# Patient Record
Sex: FEMALE | Race: WHITE
[De-identification: names, ages, dates, MRNs, and addresses within clinical notes are randomized per-mention and may not be internally consistent; named-entity substitution may affect disease eponyms.]

---

## 2019-03-16 ENCOUNTER — HOSPITAL ENCOUNTER (EMERGENCY)
Dept: HOSPITAL 96 - M.ERS | Age: 59
Discharge: HOME | End: 2019-03-16
Payer: COMMERCIAL

## 2019-03-16 VITALS — BODY MASS INDEX: 36.1 KG/M2 | HEIGHT: 67 IN | WEIGHT: 230.01 LBS

## 2019-03-16 VITALS — SYSTOLIC BLOOD PRESSURE: 131 MMHG | DIASTOLIC BLOOD PRESSURE: 71 MMHG
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
## 2019-03-16 DIAGNOSIS — Z91.040: ICD-10-CM

## 2019-03-16 DIAGNOSIS — E04.1: ICD-10-CM

## 2019-03-16 DIAGNOSIS — F31.9: ICD-10-CM

## 2019-03-16 DIAGNOSIS — S93.491A: ICD-10-CM

## 2019-03-16 DIAGNOSIS — I10: ICD-10-CM

## 2019-03-16 DIAGNOSIS — M25.561: ICD-10-CM

## 2019-03-16 DIAGNOSIS — Z88.5: ICD-10-CM

## 2019-03-16 DIAGNOSIS — Z98.890: ICD-10-CM
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
## 2019-03-16 DIAGNOSIS — Z90.711: ICD-10-CM

## 2019-03-16 DIAGNOSIS — M25.521: ICD-10-CM

## 2019-03-16 DIAGNOSIS — S22.41XA: Primary | ICD-10-CM

## 2019-03-16 DIAGNOSIS — M25.511: ICD-10-CM

## 2019-03-16 DIAGNOSIS — Z91.048: ICD-10-CM

## 2019-03-16 LAB
ABSOLUTE BASOPHILS: 0 THOU/UL (ref 0–0.2)
ABSOLUTE EOSINOPHILS: 0.1 THOU/UL (ref 0–0.7)
ABSOLUTE MONOCYTES: 0.5 THOU/UL (ref 0–1.2)
ALBUMIN SERPL-MCNC: 3.9 G/DL (ref 3.4–5)
ALP SERPL-CCNC: 64 U/L (ref 46–116)
ALT SERPL-CCNC: 52 U/L (ref 30–65)
ANION GAP SERPL CALC-SCNC: 9 MMOL/L (ref 7–16)
AST SERPL-CCNC: 40 U/L (ref 15–37)
BASOPHILS NFR BLD AUTO: 0.5 %
BILIRUB SERPL-MCNC: 0.3 MG/DL
BUN SERPL-MCNC: 18 MG/DL (ref 7–18)
CALCIUM SERPL-MCNC: 9.1 MG/DL (ref 8.5–10.1)
CHLORIDE SERPL-SCNC: 104 MMOL/L (ref 98–107)
CO2 SERPL-SCNC: 31 MMOL/L (ref 21–32)
CREAT SERPL-MCNC: 0.9 MG/DL (ref 0.6–1.3)
EOSINOPHIL NFR BLD: 1.6 %
GLUCOSE SERPL-MCNC: 133 MG/DL (ref 70–99)
GRANULOCYTES NFR BLD MANUAL: 67.5 %
HCT VFR BLD CALC: 44.2 % (ref 37–47)
HGB BLD-MCNC: 15.1 GM/DL (ref 12–15)
LYMPHOCYTES # BLD: 1.9 THOU/UL (ref 0.8–5.3)
LYMPHOCYTES NFR BLD AUTO: 23.9 %
MCH RBC QN AUTO: 31.9 PG (ref 26–34)
MCHC RBC AUTO-ENTMCNC: 34.2 G/DL (ref 28–37)
MCV RBC: 93.2 FL (ref 80–100)
MONOCYTES NFR BLD: 6.5 %
MPV: 8.4 FL. (ref 7.2–11.1)
NEUTROPHILS # BLD: 5.4 THOU/UL (ref 1.6–8.1)
NUCLEATED RBCS: 0 /100WBC
PLATELET COUNT*: 288 THOU/UL (ref 150–400)
POTASSIUM SERPL-SCNC: 3.7 MMOL/L (ref 3.5–5.1)
PROT SERPL-MCNC: 7.6 G/DL (ref 6.4–8.2)
RBC # BLD AUTO: 4.74 MIL/UL (ref 4.2–5)
RDW-CV: 12.6 % (ref 10.5–14.5)
SODIUM SERPL-SCNC: 144 MMOL/L (ref 136–145)
TROPONIN-I LEVEL: <0.06 NG/ML (ref ?–0.06)
WBC # BLD AUTO: 8.1 THOU/UL (ref 4–11)

## 2019-03-17 NOTE — EKG
[place] [address]
Phone:  [phone]                     ELECTROCARDIOGRAM REPORT      
_______________________________________________________________________________
 
Name:       BETHANY FLOYD                 Room:                      [facility]#:  [id number]      Account #:      [id number]  
Admission:  19     Attend Phys:                         
Discharge:  19     Date of Birth:  [birth year]  
         Report #: [id number]
    [id number]
_______________________________________________________________________________
THIS REPORT FOR:  //name//                      
 
                         [facility] ED
                                       
Test Date:    2019               Test Time:    19:50:01
Pat Name:     BETHANY FLOYD             Department:   
Patient ID:   [id number]            Room:          
Gender:       F                        Technician:   LACY
:          1960               Requested By: Belkys Sarmiento
Order Number: [id number] MD:   Medardo Wolff
                                 Measurements
Intervals                              Axis          
Rate:         75                       P:            7
NC:           177                      QRS:          -20
QRSD:         86                       T:            19
QT:           387                                    
QTc:          433                                    
                           Interpretive Statements
Sinus rhythm
Abnormal R-wave progression, early transition
Inferior infarct, old
No previous ECG available for comparison
 
Electronically Signed On 3- 12:14:58 CDT by Medardo Wolff
[email or web address]
 
 
 
 
 
 
 
 
 
 
 
 
 
 
 
 
 
 
  <ELECTRONICALLY SIGNED>
                                           By: Medardo Wolff MD, [facility]   
  19     1214
D: [id number]   _____________________________________
T: 19   Medardo Wolff MD, FACC     /EPI